# Patient Record
(demographics unavailable — no encounter records)

---

## 2025-03-10 NOTE — HISTORY OF PRESENT ILLNESS
[Patient reported hearing was abnormal] : Patient reported hearing was abnormal [Patient reported vision is normal] : Patient reported vision is normal [Patient reported Patient reported dental screening is normal] : Patient reported dental screening is normal [Patient reported colon/rectal/cancer screening was normal] : Patient reported colon/rectal cancer screening was normal [Patient reported skin cancer screening was normal] : Patient reported skin cancer screening was normal [PMH Reviewed and Updated] : past medical history reviewed and updated [PSH Reviewed and Updated] : past surgical history reviewed and updated [Family History Reviewed and Updated] : family history reviewed and updated [Medication and Allergies Reconciled] : medication and allergies reconciled [No falls in past year] : Patient reported no falls in the past year [Completely Independent] : Completely independent. [NO] : No [PHQ-2 Negative - No further assessment needed] : PHQ-2 Negative - No further assessment needed [I have developed a follow-up plan documented below in the note.] : I have developed a follow-up plan documented below in the note. [0] : 0 [Working Full Time] : working full time [Never] : has never used illicit drugs [Compliant with medications] : compliant with medications [Adequate] : adequate [Spouse] : spouse [Friends] : friends [Children] : children [Fully Independent] : fully independent [Drives without concerns] : drives without concerns [Seatbelts] : seatbelts [Smoke Detectors] : smoke detectors [Carbon Monoxide Detector] : carbon monoxide detector [Advanced Directives Discussed] : discussed at today's visit [Patient Has Full Capacity] : this patient has full decision making capacity for discussion of advance care planning [Over the Past 2 Weeks, Have You Felt Down, Depressed, or Hopeless?] : 1.) Over the past 2 weeks, have you felt down, depressed, or hopeless? No [Over the Past 2 Weeks, Have You Felt Little Interest or Pleasure Doing Things?] : 2.) Over the past 2 weeks, have you felt little interest or pleasure doing things? No [Bathroom Grab Bars] : not using bathroom grab bars [de-identified] : socially on weekends 2x a week 1-2 glasses [TextBox_31] : age appropriate high freq hearing loss [BoneDensityDate] : 01/24 [TextBox_37] : corrective lenses  for distance [TextBox_43] : every 6 months [Mammogramdate] : 01/21 [TextBox_19] : in 10yrs next [FreeTextEntry1] : 09/23 [FreeTextEntry2] : derm skin exam [de-identified] : discussed mood for atleast 5 min [[id number]] : 0